# Patient Record
Sex: FEMALE | Race: WHITE | Employment: PART TIME | URBAN - NONMETROPOLITAN AREA
[De-identification: names, ages, dates, MRNs, and addresses within clinical notes are randomized per-mention and may not be internally consistent; named-entity substitution may affect disease eponyms.]

---

## 2017-02-13 ENCOUNTER — MED REC SCAN ONLY (OUTPATIENT)
Dept: FAMILY MEDICINE CLINIC | Facility: CLINIC | Age: 62
End: 2017-02-13

## 2017-02-15 ENCOUNTER — TELEPHONE (OUTPATIENT)
Dept: FAMILY MEDICINE CLINIC | Facility: CLINIC | Age: 62
End: 2017-02-15

## 2017-02-15 NOTE — TELEPHONE ENCOUNTER
Pt states she saw Dr. Mil Bowen and was advised that she has an inflammed thyroid. Dr. Mil Bowen would like pt to have this addressed by an endocrinologist and is wanting to know who Dr. Renetta Cuevas recommends.  Pt also states that she is to have an endoscopy with

## 2017-02-15 NOTE — TELEPHONE ENCOUNTER
Spoke with Stephania Coronel at Dr. Everett Holloway who states office attempted to call pt to schedule on 1/25 and 1/27 message left both times. Stephania Coronel states that pt did call back and scheduled for May 2 through the .  Stephania Coronel states that Dr. Jordyn Cisneros has openings on Tuesda

## 2017-03-02 ENCOUNTER — TELEPHONE (OUTPATIENT)
Dept: FAMILY MEDICINE CLINIC | Facility: CLINIC | Age: 62
End: 2017-03-02

## 2017-05-11 ENCOUNTER — TELEPHONE (OUTPATIENT)
Dept: FAMILY MEDICINE CLINIC | Facility: CLINIC | Age: 62
End: 2017-05-11

## 2017-05-11 NOTE — TELEPHONE ENCOUNTER
Would be OK to wean off Prilosec  Lab OK to do with either me or specialist                   Katelyn Julian, ARMIN at 4/24/2017  1:22 PM      Status: Signed         Expand All Collapse All    Routed to Dr Louise Samuels the 2 questions Colette Jovel has for him.

## 2017-05-15 ENCOUNTER — APPOINTMENT (OUTPATIENT)
Dept: LAB | Age: 62
End: 2017-05-15
Attending: FAMILY MEDICINE
Payer: COMMERCIAL

## 2017-05-15 DIAGNOSIS — E03.9 ACQUIRED HYPOTHYROIDISM: ICD-10-CM

## 2017-05-15 PROCEDURE — 84443 ASSAY THYROID STIM HORMONE: CPT | Performed by: FAMILY MEDICINE

## 2017-05-15 PROCEDURE — 84439 ASSAY OF FREE THYROXINE: CPT | Performed by: FAMILY MEDICINE

## 2017-05-15 PROCEDURE — 36415 COLL VENOUS BLD VENIPUNCTURE: CPT | Performed by: FAMILY MEDICINE

## 2017-05-16 DIAGNOSIS — E04.1 THYROID NODULE: Primary | ICD-10-CM

## 2017-05-16 DIAGNOSIS — E03.9 ACQUIRED HYPOTHYROIDISM: ICD-10-CM

## 2017-06-12 ENCOUNTER — HOSPITAL ENCOUNTER (OUTPATIENT)
Dept: ULTRASOUND IMAGING | Age: 62
Discharge: HOME | End: 2017-06-12
Attending: OTOLARYNGOLOGY
Payer: COMMERCIAL

## 2017-06-12 DIAGNOSIS — E04.2 NONTOXIC MULTINODULAR GOITER: ICD-10-CM

## 2017-06-12 PROCEDURE — 76536 US EXAM OF HEAD AND NECK: CPT | Performed by: OTOLARYNGOLOGY

## 2017-06-19 DIAGNOSIS — E03.9 ACQUIRED HYPOTHYROIDISM: ICD-10-CM

## 2017-06-19 RX ORDER — LEVOTHYROXINE SODIUM 0.07 MG/1
75 TABLET ORAL DAILY
Qty: 90 TABLET | Refills: 3 | Status: SHIPPED | OUTPATIENT
Start: 2017-06-19 | End: 2018-03-14

## 2017-06-19 RX ORDER — OMEPRAZOLE 20 MG/1
20 CAPSULE, DELAYED RELEASE ORAL
Qty: 90 CAPSULE | Refills: 1 | Status: SHIPPED | OUTPATIENT
Start: 2017-06-19 | End: 2017-12-19

## 2017-06-19 RX ORDER — LEVOTHYROXINE SODIUM 0.07 MG/1
1 TABLET ORAL DAILY
Refills: 3 | COMMUNITY
Start: 2017-05-24 | End: 2017-06-19

## 2017-06-19 NOTE — TELEPHONE ENCOUNTER
Last office visit 12-7-16  Last refill Omeprazole 12-8-16 #90 with 1 refill. Last refill Levothyroxine 12-8-16 #90 with 1 refill.   Next TSH due May 2018

## 2017-06-19 NOTE — TELEPHONE ENCOUNTER
From: Judith Bamberger  To: Diony Chinchilla DO  Sent: 6/19/2017 8:28 AM CDT  Subject: Medication Renewal Request    Original authorizing provider: Gaila Millet, DO Judith Bamberger would like a refill of the following medications:  omeprazole 20 MG Oral Ca

## 2017-07-03 ENCOUNTER — MED REC SCAN ONLY (OUTPATIENT)
Dept: FAMILY MEDICINE CLINIC | Facility: CLINIC | Age: 62
End: 2017-07-03

## 2017-12-19 DIAGNOSIS — E03.9 ACQUIRED HYPOTHYROIDISM: ICD-10-CM

## 2017-12-20 RX ORDER — OMEPRAZOLE 20 MG/1
CAPSULE, DELAYED RELEASE ORAL
Qty: 90 CAPSULE | Refills: 1 | Status: SHIPPED | OUTPATIENT
Start: 2017-12-20 | End: 2019-06-18

## 2017-12-20 NOTE — TELEPHONE ENCOUNTER
Last office visit 12-7-16  Last refill 6-19-17 #90 with 1  No future appointments. Patient advised due for office visit as it has been 1 year since last.  Will refill this time, but has to be seen before next. Patient verbalizes understanding.   States sh

## 2018-02-03 ENCOUNTER — TELEPHONE (OUTPATIENT)
Dept: FAMILY MEDICINE CLINIC | Facility: CLINIC | Age: 63
End: 2018-02-03

## 2018-02-03 ENCOUNTER — OFFICE VISIT (OUTPATIENT)
Dept: FAMILY MEDICINE CLINIC | Facility: CLINIC | Age: 63
End: 2018-02-03

## 2018-02-03 VITALS
TEMPERATURE: 98 F | WEIGHT: 190 LBS | DIASTOLIC BLOOD PRESSURE: 80 MMHG | OXYGEN SATURATION: 97 % | SYSTOLIC BLOOD PRESSURE: 128 MMHG | RESPIRATION RATE: 16 BRPM | HEIGHT: 62 IN | HEART RATE: 90 BPM | BODY MASS INDEX: 34.96 KG/M2

## 2018-02-03 DIAGNOSIS — J20.9 ACUTE BRONCHITIS, UNSPECIFIED ORGANISM: Primary | ICD-10-CM

## 2018-02-03 PROCEDURE — 99213 OFFICE O/P EST LOW 20 MIN: CPT | Performed by: PHYSICIAN ASSISTANT

## 2018-02-03 RX ORDER — PREDNISONE 20 MG/1
20 TABLET ORAL 2 TIMES DAILY
Qty: 10 TABLET | Refills: 0 | Status: SHIPPED | OUTPATIENT
Start: 2018-02-03 | End: 2018-02-08

## 2018-02-03 RX ORDER — GUAIFENESIN AND CODEINE PHOSPHATE 100; 10 MG/5ML; MG/5ML
10 SOLUTION ORAL EVERY 6 HOURS PRN
Qty: 120 ML | Refills: 0 | Status: SHIPPED | OUTPATIENT
Start: 2018-02-03 | End: 2018-06-18 | Stop reason: ALTCHOICE

## 2018-02-03 RX ORDER — AZITHROMYCIN 250 MG/1
TABLET, FILM COATED ORAL
Qty: 6 TABLET | Refills: 0 | Status: SHIPPED | OUTPATIENT
Start: 2018-02-03 | End: 2018-02-08

## 2018-02-03 RX ORDER — IPRATROPIUM BROMIDE AND ALBUTEROL SULFATE 2.5; .5 MG/3ML; MG/3ML
3 SOLUTION RESPIRATORY (INHALATION)
COMMUNITY
End: 2019-06-18

## 2018-02-03 RX ORDER — ALBUTEROL SULFATE 2.5 MG/3ML
2.5 SOLUTION RESPIRATORY (INHALATION) EVERY 4 HOURS PRN
Qty: 75 ML | Refills: 1 | Status: SHIPPED | OUTPATIENT
Start: 2018-02-03 | End: 2020-06-16 | Stop reason: ALTCHOICE

## 2018-02-03 NOTE — PATIENT INSTRUCTIONS
1.  Zithromax as prescribed, you will take this for 5 days and it stays in your system for 10 days. 2.  Prednisone 20 mg twice daily for 5 days. This is an oral steroid to help with inflammation, cough and shortness of breath.    3.  Albuterol inhaler vi

## 2018-02-03 NOTE — TELEPHONE ENCOUNTER
Pt has a bad cough, chest is sore and raw when coughing. Body aches and pains. Advised that there are no appt available and suggested UC, pt wanted to have the nurse call her back.

## 2018-02-03 NOTE — TELEPHONE ENCOUNTER
Patient presently without insurance. Has had symptoms since Thursday; used her 's neb machine twice yesterday with duoneb.   Advised- really needs to be seen; Dr Satinder Ly will order Tamiflu, but anything beyond that needs to be seen; and unsure of cos

## 2018-02-03 NOTE — PROGRESS NOTES
CHIEF COMPLAINT:   Patient presents with:  Cough: CHEST CONGESTION , BODY ACHES , FEVER 100.6 LAST NIGHT , COUGH X 2 DAYS       HPI:   Waldemar Sanches is a 63year old female who presents for upper respiratory sx x 2-3 days.   Reports onset of chest burnin dependence 12/5/2012      Past Surgical History:  No date: HYSTERECTOMY  No date: OTHER      Comment: biopsy right breast  No date: OTHER      Comment: vein stripping   No date: OTHER      Comment: laser vein right leg       Smoking status: Former Smoker Prescriptions Disp Refills    azithromycin (ZITHROMAX Z-SANDRA) 250 MG Oral Tab 6 tablet 0      Sig: Take two tablets by mouth today, then one tablet daily for 4 days      predniSONE 20 MG Oral Tab 10 tablet 0      Sig: Take 1 tablet (20 mg total) by mouth 2

## 2018-03-14 ENCOUNTER — TELEPHONE (OUTPATIENT)
Dept: FAMILY MEDICINE CLINIC | Facility: CLINIC | Age: 63
End: 2018-03-14

## 2018-03-14 RX ORDER — LEVOTHYROXINE SODIUM 0.07 MG/1
75 TABLET ORAL DAILY
Qty: 90 TABLET | Refills: 0 | Status: SHIPPED | OUTPATIENT
Start: 2018-03-14 | End: 2018-06-19

## 2018-03-14 NOTE — TELEPHONE ENCOUNTER
Patient notified. Patient verbalized understanding. Refill of 75 mcg sent to [facility].       Deb Corona, 03/14/18, 11:21 AM

## 2018-03-14 NOTE — TELEPHONE ENCOUNTER
Patient called and states that she her refills for her levothyroxine because her [facility]ills for [facility] were canceled as she no longer have insurance.      Patient states she has a bottle of 50 mcg tabs at home and is wanting to know if she can take th

## 2018-03-14 NOTE — TELEPHONE ENCOUNTER
Ling Burnett is almost due for a rf on her thyroid medicine is she due for a blood test or does she need to see the doctor?

## 2018-06-18 ENCOUNTER — OFFICE VISIT (OUTPATIENT)
Dept: FAMILY MEDICINE CLINIC | Facility: CLINIC | Age: 63
End: 2018-06-18

## 2018-06-18 ENCOUNTER — APPOINTMENT (OUTPATIENT)
Dept: LAB | Age: 63
End: 2018-06-18
Attending: FAMILY MEDICINE
Payer: COMMERCIAL

## 2018-06-18 VITALS
SYSTOLIC BLOOD PRESSURE: 136 MMHG | HEART RATE: 80 BPM | DIASTOLIC BLOOD PRESSURE: 70 MMHG | HEIGHT: 63 IN | BODY MASS INDEX: 33.36 KG/M2 | WEIGHT: 188.25 LBS | TEMPERATURE: 97 F | RESPIRATION RATE: 12 BRPM

## 2018-06-18 DIAGNOSIS — E04.1 THYROID NODULE: ICD-10-CM

## 2018-06-18 DIAGNOSIS — H25.011 CATARACT CORTICAL, SENILE, RIGHT: ICD-10-CM

## 2018-06-18 DIAGNOSIS — E03.9 ACQUIRED HYPOTHYROIDISM: ICD-10-CM

## 2018-06-18 DIAGNOSIS — Z01.818 PRE-OP EVALUATION: Primary | ICD-10-CM

## 2018-06-18 PROCEDURE — 82306 VITAMIN D 25 HYDROXY: CPT | Performed by: FAMILY MEDICINE

## 2018-06-18 PROCEDURE — 36415 COLL VENOUS BLD VENIPUNCTURE: CPT | Performed by: FAMILY MEDICINE

## 2018-06-18 PROCEDURE — 99214 OFFICE O/P EST MOD 30 MIN: CPT | Performed by: FAMILY MEDICINE

## 2018-06-18 NOTE — PROGRESS NOTES
HPI:    Patient ID: Naveed Azar is a 66year old female. Doing better with neck discomfort. Without heartburn. Takes omeprazole occasionally. Without problems with thyroid medicine. Breathing stable.   Exercise without chest pain, palpitations, sh normal mood and affect. Vitals reviewed.   /70 (BP Location: Right arm, Patient Position: Sitting, Cuff Size: large)   Pulse 80   Temp 97.3 °F (36.3 °C) (Temporal)   Resp 12   Ht 63\"   Wt 188 lb 4 oz   BMI 33.35 kg/m²              ASSESSMENT/PLAN:

## 2018-06-18 NOTE — PATIENT INSTRUCTIONS
OK for procedure. Call with laboratory studies. Call with questions or problems. Continue current care.

## 2018-06-19 DIAGNOSIS — E03.9 HYPOTHYROIDISM, UNSPECIFIED TYPE: Primary | ICD-10-CM

## 2018-06-19 DIAGNOSIS — E55.9 VITAMIN D DEFICIENCY: Primary | ICD-10-CM

## 2018-06-19 RX ORDER — LEVOTHYROXINE SODIUM 0.07 MG/1
75 TABLET ORAL DAILY
Qty: 90 TABLET | Refills: 3 | Status: SHIPPED | OUTPATIENT
Start: 2018-06-19 | End: 2019-06-19

## 2018-10-17 ENCOUNTER — APPOINTMENT (OUTPATIENT)
Dept: LAB | Age: 63
End: 2018-10-17
Attending: FAMILY MEDICINE
Payer: COMMERCIAL

## 2018-10-17 DIAGNOSIS — E55.9 VITAMIN D DEFICIENCY: ICD-10-CM

## 2018-10-17 PROCEDURE — 36415 COLL VENOUS BLD VENIPUNCTURE: CPT | Performed by: FAMILY MEDICINE

## 2018-10-17 PROCEDURE — 82306 VITAMIN D 25 HYDROXY: CPT | Performed by: FAMILY MEDICINE

## 2018-10-22 RX ORDER — CHOLECALCIFEROL (VITAMIN D3) 125 MCG
1 CAPSULE ORAL DAILY
Qty: 90 TABLET | Refills: 3 | COMMUNITY
Start: 2018-10-22 | End: 2020-06-16

## 2019-04-24 ENCOUNTER — PATIENT OUTREACH (OUTPATIENT)
Dept: FAMILY MEDICINE CLINIC | Facility: CLINIC | Age: 64
End: 2019-04-24

## 2019-06-18 ENCOUNTER — OFFICE VISIT (OUTPATIENT)
Dept: FAMILY MEDICINE CLINIC | Facility: CLINIC | Age: 64
End: 2019-06-18
Payer: COMMERCIAL

## 2019-06-18 ENCOUNTER — APPOINTMENT (OUTPATIENT)
Dept: LAB | Age: 64
End: 2019-06-18
Attending: FAMILY MEDICINE
Payer: COMMERCIAL

## 2019-06-18 VITALS
SYSTOLIC BLOOD PRESSURE: 130 MMHG | DIASTOLIC BLOOD PRESSURE: 82 MMHG | RESPIRATION RATE: 12 BRPM | OXYGEN SATURATION: 98 % | BODY MASS INDEX: 33.38 KG/M2 | WEIGHT: 188.38 LBS | HEART RATE: 60 BPM | HEIGHT: 63 IN | TEMPERATURE: 98 F

## 2019-06-18 DIAGNOSIS — Z87.891 HISTORY OF TOBACCO ABUSE: ICD-10-CM

## 2019-06-18 DIAGNOSIS — E03.9 HYPOTHYROIDISM, UNSPECIFIED TYPE: ICD-10-CM

## 2019-06-18 DIAGNOSIS — Z23 NEED FOR VACCINATION: ICD-10-CM

## 2019-06-18 DIAGNOSIS — Z00.00 ENCOUNTER FOR WELLNESS EXAMINATION IN ADULT: ICD-10-CM

## 2019-06-18 DIAGNOSIS — I83.93 ASYMPTOMATIC VARICOSE VEINS OF BOTH LOWER EXTREMITIES: ICD-10-CM

## 2019-06-18 DIAGNOSIS — Z00.00 ENCOUNTER FOR WELLNESS EXAMINATION IN ADULT: Primary | ICD-10-CM

## 2019-06-18 DIAGNOSIS — Z12.39 BREAST CANCER SCREENING: ICD-10-CM

## 2019-06-18 PROCEDURE — 81003 URINALYSIS AUTO W/O SCOPE: CPT | Performed by: FAMILY MEDICINE

## 2019-06-18 PROCEDURE — 99396 PREV VISIT EST AGE 40-64: CPT | Performed by: FAMILY MEDICINE

## 2019-06-18 PROCEDURE — 90472 IMMUNIZATION ADMIN EACH ADD: CPT | Performed by: FAMILY MEDICINE

## 2019-06-18 PROCEDURE — 90715 TDAP VACCINE 7 YRS/> IM: CPT | Performed by: FAMILY MEDICINE

## 2019-06-18 PROCEDURE — 90471 IMMUNIZATION ADMIN: CPT | Performed by: FAMILY MEDICINE

## 2019-06-18 PROCEDURE — 80050 GENERAL HEALTH PANEL: CPT | Performed by: FAMILY MEDICINE

## 2019-06-18 PROCEDURE — 80061 LIPID PANEL: CPT | Performed by: FAMILY MEDICINE

## 2019-06-18 NOTE — PATIENT INSTRUCTIONS
Discussed resistance training. Diet, weight loss. High protein, low carbohydrate eating. Call with laboratory studies. Call with questions or problems. Goal 10 pound weight loss.

## 2019-06-18 NOTE — PROGRESS NOTES
Mazin Barksdale is a 60year old female. Patient presents with:  Physical: room 8      HPI:   Px  Patient feeling well. Without problems or concerns. Occasional GERD–results with Zantac. Caring for her mother and father and down in [place].     Current GENERAL: well developed, well nourished,in no apparent distress  SKIN: no rashes,no suspicious lesions  HEENT: atraumatic, normocephalic, R TM normal, L TM normal, Pharynx normal  NECK: supple, no cervical adenopathy  LUNGS: clear to auscultation  CARDIO

## 2019-06-19 DIAGNOSIS — E03.9 HYPOTHYROIDISM, UNSPECIFIED TYPE: ICD-10-CM

## 2019-06-19 DIAGNOSIS — Z00.00 ENCOUNTER FOR WELLNESS EXAMINATION IN ADULT: Primary | ICD-10-CM

## 2019-06-19 RX ORDER — LEVOTHYROXINE SODIUM 0.07 MG/1
75 TABLET ORAL DAILY
Qty: 90 TABLET | Refills: 3 | Status: SHIPPED | OUTPATIENT
Start: 2019-06-19 | End: 2020-06-18

## 2019-07-15 ENCOUNTER — TELEPHONE (OUTPATIENT)
Dept: FAMILY MEDICINE CLINIC | Facility: CLINIC | Age: 64
End: 2019-07-15

## 2019-07-19 ENCOUNTER — TELEPHONE (OUTPATIENT)
Dept: FAMILY MEDICINE CLINIC | Facility: CLINIC | Age: 64
End: 2019-07-19

## 2020-06-11 DIAGNOSIS — E03.9 HYPOTHYROIDISM, UNSPECIFIED TYPE: ICD-10-CM

## 2020-06-11 RX ORDER — LEVOTHYROXINE SODIUM 0.07 MG/1
TABLET ORAL
Qty: 90 TABLET | Refills: 0 | OUTPATIENT
Start: 2020-06-11

## 2020-06-11 NOTE — TELEPHONE ENCOUNTER
Last Office Visit: 6/18/19  Last Refill: 6/19/19  Last Labs: 6/18/19,  Repeat in year    Due for physical and labs. Spoke with patient and she scheduled. Has a couple more weeks of medications.

## 2020-06-16 ENCOUNTER — OFFICE VISIT (OUTPATIENT)
Dept: FAMILY MEDICINE CLINIC | Facility: CLINIC | Age: 65
End: 2020-06-16
Payer: COMMERCIAL

## 2020-06-16 ENCOUNTER — APPOINTMENT (OUTPATIENT)
Dept: LAB | Age: 65
End: 2020-06-16
Attending: FAMILY MEDICINE
Payer: COMMERCIAL

## 2020-06-16 VITALS
SYSTOLIC BLOOD PRESSURE: 136 MMHG | TEMPERATURE: 99 F | HEART RATE: 67 BPM | WEIGHT: 194.38 LBS | HEIGHT: 63 IN | OXYGEN SATURATION: 92 % | BODY MASS INDEX: 34.44 KG/M2 | DIASTOLIC BLOOD PRESSURE: 80 MMHG

## 2020-06-16 DIAGNOSIS — Z00.00 ENCOUNTER FOR WELLNESS EXAMINATION IN ADULT: Primary | ICD-10-CM

## 2020-06-16 DIAGNOSIS — Z00.00 ENCOUNTER FOR WELLNESS EXAMINATION IN ADULT: ICD-10-CM

## 2020-06-16 DIAGNOSIS — Z87.891 HISTORY OF TOBACCO ABUSE: ICD-10-CM

## 2020-06-16 DIAGNOSIS — I83.93 ASYMPTOMATIC VARICOSE VEINS OF BOTH LOWER EXTREMITIES: ICD-10-CM

## 2020-06-16 DIAGNOSIS — E03.9 HYPOTHYROIDISM, UNSPECIFIED TYPE: ICD-10-CM

## 2020-06-16 DIAGNOSIS — E66.9 OBESITY (BMI 30.0-34.9): ICD-10-CM

## 2020-06-16 PROCEDURE — 84439 ASSAY OF FREE THYROXINE: CPT | Performed by: FAMILY MEDICINE

## 2020-06-16 PROCEDURE — 80053 COMPREHEN METABOLIC PANEL: CPT | Performed by: FAMILY MEDICINE

## 2020-06-16 PROCEDURE — 84443 ASSAY THYROID STIM HORMONE: CPT | Performed by: FAMILY MEDICINE

## 2020-06-16 PROCEDURE — 99396 PREV VISIT EST AGE 40-64: CPT | Performed by: FAMILY MEDICINE

## 2020-06-16 PROCEDURE — 80061 LIPID PANEL: CPT | Performed by: FAMILY MEDICINE

## 2020-06-16 PROCEDURE — 36415 COLL VENOUS BLD VENIPUNCTURE: CPT | Performed by: FAMILY MEDICINE

## 2020-06-16 NOTE — PROGRESS NOTES
Kvng Arita is a 63year old female. Patient presents with:  CPX: annual physical, fasting labs. ..room 8      HPI:   Px  Calcium Carbonate-Vitamin D (CALCIUM-D) 600-400 MG-UNIT Oral Tab, Take by mouth., Disp: , Rfl:   Levothyroxine Sodium 75 MCG Oral clear to auscultation  CARDIO: RRR without murmur  ABD soft, nontender, normal BS, no masses, rebound or guarding. No organomegaly or CVA tenderness.   EXTREMITIES: no edema          ASSESSMENT AND PLAN:     Hypothyroidism, unspecified type  Asymptomatic va

## 2020-06-18 ENCOUNTER — NURSE ONLY (OUTPATIENT)
Dept: FAMILY MEDICINE CLINIC | Facility: CLINIC | Age: 65
End: 2020-06-18
Payer: COMMERCIAL

## 2020-06-18 DIAGNOSIS — E11.9 TYPE 2 DIABETES MELLITUS WITHOUT COMPLICATION, WITHOUT LONG-TERM CURRENT USE OF INSULIN (HCC): ICD-10-CM

## 2020-06-18 DIAGNOSIS — E03.9 HYPOTHYROIDISM, UNSPECIFIED TYPE: ICD-10-CM

## 2020-06-18 DIAGNOSIS — R73.09 ELEVATED GLUCOSE LEVEL: Primary | ICD-10-CM

## 2020-06-18 PROCEDURE — 83036 HEMOGLOBIN GLYCOSYLATED A1C: CPT | Performed by: FAMILY MEDICINE

## 2020-06-18 RX ORDER — LEVOTHYROXINE SODIUM 0.07 MG/1
75 TABLET ORAL DAILY
Qty: 90 TABLET | Refills: 3 | Status: SHIPPED | OUTPATIENT
Start: 2020-06-18 | End: 2021-05-12

## 2020-06-18 NOTE — PROGRESS NOTES
Patient notified Amira Found is 7.0. Dr Samy Bravo will review  And will call her with instructions.   Per Dr Samy Bravo- order Metformin 500 mg po BID

## 2020-06-22 ENCOUNTER — TELEPHONE (OUTPATIENT)
Dept: FAMILY MEDICINE CLINIC | Facility: CLINIC | Age: 65
End: 2020-06-22

## 2020-06-22 NOTE — TELEPHONE ENCOUNTER
Started metformin on Friday and has had diarrhea eversince; she goes twice a day. Also, her acid reflux is acting up, she has heartburn. If doctor wants me to stay on it, can I take Imodium?    Will call her back after Dr Neeta Eastman addresses

## 2020-06-22 NOTE — TELEPHONE ENCOUNTER
Luis Antonio Esparza would like to speak with someone about the medication that she is on please call.

## 2020-07-09 ENCOUNTER — NURSE ONLY (OUTPATIENT)
Dept: FAMILY MEDICINE CLINIC | Facility: CLINIC | Age: 65
End: 2020-07-09

## 2020-07-09 DIAGNOSIS — E11.9 TYPE 2 DIABETES MELLITUS WITHOUT COMPLICATION, WITHOUT LONG-TERM CURRENT USE OF INSULIN (HCC): Primary | ICD-10-CM

## 2020-07-09 LAB
CARTRIDGE LOT#: 642 NUMERIC
HEMOGLOBIN A1C: 6.8 % (ref 4.3–5.6)

## 2020-07-09 PROCEDURE — 83036 HEMOGLOBIN GLYCOSYLATED A1C: CPT | Performed by: FAMILY MEDICINE

## 2020-08-10 DIAGNOSIS — E11.9 TYPE 2 DIABETES MELLITUS WITHOUT COMPLICATION, WITHOUT LONG-TERM CURRENT USE OF INSULIN (HCC): ICD-10-CM

## 2020-08-10 NOTE — TELEPHONE ENCOUNTER
Last refill #60 on 6/18/2020  Last office visit on 6/16/2020  No future appointments.   Labs current  Refilled per protocol

## 2020-11-05 DIAGNOSIS — E11.9 TYPE 2 DIABETES MELLITUS WITHOUT COMPLICATION, WITHOUT LONG-TERM CURRENT USE OF INSULIN (HCC): ICD-10-CM

## 2020-11-06 NOTE — TELEPHONE ENCOUNTER
Last refill #180 on 8/10/2020  Last office visit on 6/16/2020  No future appointments. Patient is due for an a1c  Reminder letter sent.

## 2020-11-16 ENCOUNTER — TELEPHONE (OUTPATIENT)
Dept: FAMILY MEDICINE CLINIC | Facility: CLINIC | Age: 65
End: 2020-11-16

## 2020-11-16 DIAGNOSIS — E03.9 HYPOTHYROIDISM, UNSPECIFIED TYPE: ICD-10-CM

## 2020-11-16 DIAGNOSIS — E11.9 TYPE 2 DIABETES MELLITUS WITHOUT COMPLICATION, WITHOUT LONG-TERM CURRENT USE OF INSULIN (HCC): Primary | ICD-10-CM

## 2020-11-16 PROCEDURE — 83036 HEMOGLOBIN GLYCOSYLATED A1C: CPT | Performed by: FAMILY MEDICINE

## 2020-11-19 ENCOUNTER — NURSE ONLY (OUTPATIENT)
Dept: FAMILY MEDICINE CLINIC | Facility: CLINIC | Age: 65
End: 2020-11-19
Payer: COMMERCIAL

## 2020-11-19 DIAGNOSIS — Z02.9 ENCOUNTERS FOR UNSPECIFIED ADMINISTRATIVE PURPOSE: Primary | ICD-10-CM

## 2020-11-20 DIAGNOSIS — R73.09 ELEVATED GLUCOSE LEVEL: ICD-10-CM

## 2020-11-20 DIAGNOSIS — E03.9 HYPOTHYROIDISM, UNSPECIFIED TYPE: ICD-10-CM

## 2020-11-20 DIAGNOSIS — E11.9 TYPE 2 DIABETES MELLITUS WITHOUT COMPLICATION, WITHOUT LONG-TERM CURRENT USE OF INSULIN (HCC): Primary | ICD-10-CM

## 2020-11-20 DIAGNOSIS — Z00.00 ENCOUNTER FOR WELLNESS EXAMINATION IN ADULT: ICD-10-CM

## 2021-04-14 ENCOUNTER — PATIENT MESSAGE (OUTPATIENT)
Dept: FAMILY MEDICINE CLINIC | Facility: CLINIC | Age: 66
End: 2021-04-14

## 2021-04-15 NOTE — TELEPHONE ENCOUNTER
From: Eri Corbin  To: Morgan Sheth DO  Sent: 4/14/2021 6:11 PM CDT  Subject: Other    I received my first dose of [facility] [address] on April 6 2021 through the [facility]. mass vaccination at [address].   My second

## 2021-05-12 DIAGNOSIS — E03.9 HYPOTHYROIDISM, UNSPECIFIED TYPE: ICD-10-CM

## 2021-05-12 RX ORDER — LEVOTHYROXINE SODIUM 0.07 MG/1
TABLET ORAL
Qty: 90 TABLET | Refills: 0 | Status: SHIPPED | OUTPATIENT
Start: 2021-05-12 | End: 2021-09-16

## 2021-05-12 NOTE — TELEPHONE ENCOUNTER
Last office visit: 6/16/20  Last refill: 6/18/20  Labs Due: 5/20/21  No future appointments. [employer] MESSAGE SENT TO PATIENT TO SCHEDULE OFFICE VISIT AND FASTING LAB WORK.   Name from pharmacy: Levothyroxine Sodium 75 Mcg Tab [facility]          Will file in [facility] CSDN

## 2021-06-07 ENCOUNTER — TELEPHONE (OUTPATIENT)
Dept: FAMILY MEDICINE CLINIC | Facility: CLINIC | Age: 66
End: 2021-06-07

## 2021-06-07 NOTE — TELEPHONE ENCOUNTER
MED REC REQ RECEIVED 6/7/2021 FROM DR HARRISON' OFFICE. SENT TO SCAN STAT 6/8/2021. DOS REQ: ANY AND ALL RECORDS. PT MOVED FROM THE AREA, PCP REMOVAL FORM FILLED OUT.

## 2021-06-08 ENCOUNTER — TELEPHONE (OUTPATIENT)
Dept: FAMILY MEDICINE CLINIC | Facility: CLINIC | Age: 66
End: 2021-06-08

## 2021-06-08 NOTE — TELEPHONE ENCOUNTER
Changing to a doctor in [place] [address] due to closer for where she lives. \"Nothing we did. \"    Told her medical release request received yesterday. Dr Jael Thorne made aware.

## 2021-09-16 DIAGNOSIS — E03.9 HYPOTHYROIDISM, UNSPECIFIED TYPE: ICD-10-CM

## 2021-09-16 RX ORDER — LEVOTHYROXINE SODIUM 0.07 MG/1
75 TABLET ORAL DAILY
Qty: 30 TABLET | Refills: 0 | Status: SHIPPED | OUTPATIENT
Start: 2021-09-16

## 2021-09-16 NOTE — TELEPHONE ENCOUNTER
Requested Prescriptions     Pending Prescriptions Disp Refills   • LEVOTHYROXINE 75 MCG Oral Tab [Pharmacy Med Name: Levothyroxine Sodium 75 Mcg Tab [facility]] 90 tablet 0     Sig: TAKE ONE TABLET BY MOUTH ONE TIME DAILY     Last office visit pertaining to [facility]